# Patient Record
Sex: MALE | Race: BLACK OR AFRICAN AMERICAN
[De-identification: names, ages, dates, MRNs, and addresses within clinical notes are randomized per-mention and may not be internally consistent; named-entity substitution may affect disease eponyms.]

---

## 2019-07-17 ENCOUNTER — HOSPITAL ENCOUNTER (EMERGENCY)
Dept: HOSPITAL 74 - JER | Age: 24
Discharge: HOME | End: 2019-07-17
Payer: COMMERCIAL

## 2019-07-17 VITALS — DIASTOLIC BLOOD PRESSURE: 70 MMHG | HEART RATE: 83 BPM | TEMPERATURE: 98.1 F | SYSTOLIC BLOOD PRESSURE: 120 MMHG

## 2019-07-17 VITALS — BODY MASS INDEX: 30.4 KG/M2

## 2019-07-17 DIAGNOSIS — R10.31: ICD-10-CM

## 2019-07-17 DIAGNOSIS — R01.1: ICD-10-CM

## 2019-07-17 DIAGNOSIS — N30.00: Primary | ICD-10-CM

## 2019-07-17 LAB
AMPHET UR-MCNC: NEGATIVE NG/ML
APPEARANCE UR: CLEAR
BACTERIA # UR AUTO: 4.2 /HPF
BARBITURATES UR-MCNC: NEGATIVE NG/ML
BASOPHILS # BLD: 0.6 % (ref 0–2)
BENZODIAZ UR SCN-MCNC: NEGATIVE NG/ML
BILIRUB UR STRIP.AUTO-MCNC: NEGATIVE MG/DL
CASTS URNS QL MICRO: 5 /LPF (ref 0–8)
COCAINE UR-MCNC: NEGATIVE NG/ML
COLOR UR: YELLOW
DEPRECATED RDW RBC AUTO: 13.2 % (ref 11.9–15.9)
EOSINOPHIL # BLD: 0.7 % (ref 0–4.5)
EPITH CASTS URNS QL MICRO: 2.2 /HPF
HCT VFR BLD CALC: 44.7 % (ref 35.4–49)
HGB BLD-MCNC: 14.7 GM/DL (ref 11.7–16.9)
KETONES UR QL STRIP: NEGATIVE
LEUKOCYTE ESTERASE UR QL STRIP.AUTO: (no result)
LYMPHOCYTES # BLD: 32.7 % (ref 8–40)
MCH RBC QN AUTO: 28.3 PG (ref 25.7–33.7)
MCHC RBC AUTO-ENTMCNC: 32.9 G/DL (ref 32–35.9)
MCV RBC: 86 FL (ref 80–96)
METHADONE UR-MCNC: NEGATIVE NG/ML
MONOCYTES # BLD AUTO: 8.2 % (ref 3.8–10.2)
NEUTROPHILS # BLD: 57.8 % (ref 42.8–82.8)
NITRITE UR QL STRIP: NEGATIVE
OPIATES UR QL SCN: NEGATIVE NG/ML
PCP UR QL SCN: NEGATIVE NG/ML
PH UR: 6 [PH] (ref 5–8)
PLATELET # BLD AUTO: 249 K/MM3 (ref 134–434)
PMV BLD: 8.4 FL (ref 7.5–11.1)
PROT UR QL STRIP: NEGATIVE
PROT UR QL STRIP: NEGATIVE
RBC # BLD AUTO: 1 /HPF (ref 0–4)
RBC # BLD AUTO: 5.19 M/MM3 (ref 4–5.6)
SP GR UR: 1.02 (ref 1.01–1.03)
UROBILINOGEN UR STRIP-MCNC: 0.2 MG/DL (ref 0.2–1)
WBC # BLD AUTO: 7 K/MM3 (ref 4–10)
WBC # UR AUTO: 21 /HPF (ref 0–5)

## 2019-07-17 NOTE — PDOC
History of Present Illness





- General


Chief Complaint: Pain, Acute


Stated Complaint: ABD PAIN


Time Seen by Provider: 07/17/19 17:02





- History of Present Illness


Initial Comments: 





07/17/19 17:45


CHIEF COMPLAINT: RLQ pain 





HISTORY OF PRESENT ILLNESS: 25 yo M presents to ED with RLQ pain since this 

morning.  Patient states he's had on and off stomach pain x few months but this 

morning it was worse. Patient does admit to drinking heavily almost daily, last 

drink two days ago.  He describes the pain as tearing but "it's not there 

anymore.  I've been having these stomach pains in the morning for like 1-2 

years now, but today it was a lot worse."  Patient reports last BM was just 

PTA. Patient denies any nausea, vomiting, fever, rectal bleeding.   Does not 

have PCP.    








No recent travel or sick contacts. 





PAST MEDICAL HISTORY: Denies past medical history





FAMILY HISTORY: Denies





SOCIAL HISTORY:  Drinks daily, "1 bottle of Lyla every other day, on the 

weekends I'll have like 1-2 bottles ."  Denies cigarette, illicit drug use. 





SURGICAL HISTORY:  R orchiectomy





ALLERGIES: No known drug allergies





REVIEW OF SYSTEMS


General/Constitutional: Denies fever or chills. Denies weakness, weight change.





HEENT: Denies change in vision. Denies ear pain or discharge. Denies sore 

throat.





Cardiovascular: Denies chest pain or shortness of breath.





Respiratory: Denies cough, wheezing, or hemoptysis.





Gastrointestinal: Abdominal pain this morning, now resolved. Denies nausea, 

vomiting, diarrhea or constipation. Denies rectal bleeding.





Genitourinary: Denies dysuria, frequency, or change in urination.





Musculoskeletal: Denies joint or muscle swelling or pain. Denies neck or back 

pain.





Skin: Denies rash or easy bruising.








PHYSICAL EXAM


General Appearance: Well-appearing, appropriately dressed.  No apparent distress

, no intoxication.





HEENT: EOMI, PERRLA, normal ENT inspection, normal voice, TMs normal, pharynx 

normal.  No conjunctival pallor.  No photophobia, scleral icterus.





Neck: Supple.  Trachea midline. No tenderness, rigidity, carotid bruit, stridor

, lymphadenopathy, or thyromegaly. 





Respiratory/Chest: Lungs CTAB.  No shortness of breath, chest tenderness, 

respiratory distress, accessory muscle use. No crackles, rales, rhonchi, stridor

, wheezing, dullness





Cardiovascular: RRR. S1, S2.  No JVD, murmur, bradycardia, tachycardia.





Vascular Pulses: Dorsalis-Pedis (R): 2+, Dorsalis-Pedis (L): 2+





Gastrointestinal/Abdominal: Normal bowel sounds.  Abdomen soft, non-distended.  

No tenderness or rebound tenderness. No  organomegaly, pulsatile mass, guarding

, hernia, hepatomegaly, splenomegaly.





Lymphatic: No adenopathy, tenderness.





Musculoskeletal/Extremities:  Normal inspection. FROM of all extremities, 

normal capillary refill.  Pelvis Stable.  No CVA tenderness. No tenderness to 

extremities, pedal edema, swelling, erythema or deformity.





Integumentary: Appropriate color, dry, warm.  No cyanosis, erythema, jaundice 

or rash





Neurologic: CNs II-XII intact. Fully oriented, alert.  Appropriate mood/affect. 

Motor strength 5/5.  No appreciable EOM palsy, facial droop or sensory deficit.








Past History





- Past Medical History


Allergies/Adverse Reactions: 


 Allergies











Allergy/AdvReac Type Severity Reaction Status Date / Time


 


No Known Allergies Allergy   Verified 07/26/16 20:46











Home Medications: 


Ambulatory Orders





Nitrofurantoin Monohyd/M-Cryst [Macrobid -] 100 mg PO BID #14 capsule 07/17/19 








Cardiac Disorders:  (HEART MURMUR)





- Suicide/Smoking/Psychosocial Hx


Smoking History: Never smoked


Have you smoked in the past 12 months: No


Number of Cigarettes Smoked Daily: 2


Information on smoking cessation initiated: No


'Breaking Loose' booklet given: 08/15/15


Hx Alcohol Use: No


Drug/Substance Use Hx: No


Substance Use Type: None





*Physical Exam





- Vital Signs


 Last Vital Signs











Temp Pulse Resp BP Pulse Ox


 


 98.1 F   83   18   120/70   98 


 


 07/17/19 16:32  07/17/19 16:32  07/17/19 16:32  07/17/19 16:32  07/17/19 16:32














ED Treatment Course





- LABORATORY


CBC & Chemistry Diagram: 


 07/17/19 18:05





 07/17/19 18:05





Medical Decision Making





- Medical Decision Making





07/17/19 17:53


25 yo M presents to ED with RLQ pain since this morning.





-labs


-UA, utox








07/17/19 19:26


Labs hemolyzed, pending repeat CMP.  





Patient states he wants to go home now as his symptoms have resolved. Pt with 

21 WBC on UA, will rx abx for UTI. 





Advised patient to take medication as prescribed.  Advised patient of signs and 

symptoms for return to ED.  Patient verbalized understanding and agrees to plan.





*DC/Admit/Observation/Transfer


Diagnosis at time of Disposition: 


Abdominal pain


Qualifiers:


 Abdominal location: right lower quadrant Qualified Code(s): R10.31 - Right 

lower quadrant pain





Urinary tract infection


Qualifiers:


 Urinary tract infection type: acute cystitis Hematuria presence: without 

hematuria Qualified Code(s): N30.00 - Acute cystitis without hematuria








- Discharge Dispostion


Disposition: HOME


Condition at time of disposition: Stable


Decision to Admit order: No





- Prescriptions


Prescriptions: 


Nitrofurantoin Monohyd/M-Cryst [Macrobid -] 100 mg PO BID #14 capsule





- Referrals





- Patient Instructions


Printed Discharge Instructions:  DI for Urinary Tract Infection (UTI)


Additional Instructions: 


Please take medications as prescribed.  Return to the emergency department 

immediately with ANY new, persistent or worsening symptoms.





- Post Discharge Activity

## 2019-07-17 NOTE — PDOC
Rapid Medical Evaluation


Chief Complaint: Pain, Acute


Medical Evaluation: 


 Allergies











Allergy/AdvReac Type Severity Reaction Status Date / Time


 


No Known Allergies Allergy   Verified 07/26/16 20:46











07/17/19 16:32


I have performed a brief in-person evaluation of this patient. 


The patient presents with a chief complaint of: RLQ started this AM, No N/V - 

admits to drinking hard liquor daily- had alcoholic gastric ulcers 2 years ago 

- states last drink 2 days ago


Pertinent physical exam findings: Abd soft with no rebound 


I have ordered the following: labs/ Ua/ tox


The patient will proceed to the ED for further evaluation.


07/17/19 16:37








**Discharge Disposition





- Diagnosis


 Abdominal pain








- Referrals





- Patient Instructions





- Post Discharge Activity

## 2021-03-07 ENCOUNTER — HOSPITAL ENCOUNTER (INPATIENT)
Dept: HOSPITAL 74 - JER | Age: 26
LOS: 1 days | Discharge: HOME | DRG: 225 | End: 2021-03-08
Attending: NURSE PRACTITIONER | Admitting: HOSPITALIST
Payer: COMMERCIAL

## 2021-03-07 VITALS — BODY MASS INDEX: 31.1 KG/M2

## 2021-03-07 DIAGNOSIS — F10.10: ICD-10-CM

## 2021-03-07 DIAGNOSIS — N17.9: ICD-10-CM

## 2021-03-07 DIAGNOSIS — E66.3: ICD-10-CM

## 2021-03-07 DIAGNOSIS — K76.0: ICD-10-CM

## 2021-03-07 DIAGNOSIS — R10.31: ICD-10-CM

## 2021-03-07 DIAGNOSIS — R74.01: ICD-10-CM

## 2021-03-07 DIAGNOSIS — K35.80: Primary | ICD-10-CM

## 2021-03-07 DIAGNOSIS — R74.8: ICD-10-CM

## 2021-03-07 LAB
ALBUMIN SERPL-MCNC: 4 G/DL (ref 3.4–5)
ALP SERPL-CCNC: 72 U/L (ref 45–117)
ALT SERPL-CCNC: 126 U/L (ref 13–61)
ANION GAP SERPL CALC-SCNC: 6 MMOL/L (ref 8–16)
APPEARANCE UR: CLEAR
AST SERPL-CCNC: 47 U/L (ref 15–37)
BASOPHILS # BLD: 0.9 % (ref 0–2)
BILIRUB SERPL-MCNC: 0.4 MG/DL (ref 0.2–1)
BILIRUB UR STRIP.AUTO-MCNC: NEGATIVE MG/DL
BUN SERPL-MCNC: 17.3 MG/DL (ref 7–18)
CALCIUM SERPL-MCNC: 9.6 MG/DL (ref 8.5–10.1)
CHLORIDE SERPL-SCNC: 101 MMOL/L (ref 98–107)
CO2 SERPL-SCNC: 30 MMOL/L (ref 21–32)
COLOR UR: YELLOW
CREAT SERPL-MCNC: 1.4 MG/DL (ref 0.55–1.3)
DEPRECATED RDW RBC AUTO: 13.7 % (ref 11.9–15.9)
EOSINOPHIL # BLD: 1 % (ref 0–4.5)
GLUCOSE SERPL-MCNC: 92 MG/DL (ref 74–106)
HCT VFR BLD CALC: 42.3 % (ref 35.4–49)
HGB BLD-MCNC: 14.2 GM/DL (ref 11.7–16.9)
HIV 1+2 AB+HIV1 P24 AG SERPL QL IA: NEGATIVE
KETONES UR QL STRIP: NEGATIVE
LEUKOCYTE ESTERASE UR QL STRIP.AUTO: NEGATIVE
LIPASE SERPL-CCNC: 71 U/L (ref 73–393)
LYMPHOCYTES # BLD: 29.8 % (ref 8–40)
MCH RBC QN AUTO: 29.3 PG (ref 25.7–33.7)
MCHC RBC AUTO-ENTMCNC: 33.6 G/DL (ref 32–35.9)
MCV RBC: 87.2 FL (ref 80–96)
MONOCYTES # BLD AUTO: 8.7 % (ref 3.8–10.2)
NEUTROPHILS # BLD: 59.6 % (ref 42.8–82.8)
NITRITE UR QL STRIP: NEGATIVE
PH UR: 6.5 [PH] (ref 5–8)
PLATELET # BLD AUTO: 243 K/MM3 (ref 134–434)
PMV BLD: 8.4 FL (ref 7.5–11.1)
PROT SERPL-MCNC: 7.9 G/DL (ref 6.4–8.2)
PROT UR QL STRIP: NEGATIVE
PROT UR QL STRIP: NEGATIVE
RBC # BLD AUTO: 4.85 M/MM3 (ref 4–5.6)
SODIUM SERPL-SCNC: 137 MMOL/L (ref 136–145)
SP GR UR: 1.02 (ref 1.01–1.03)
TREPONEMA PALLIDUM AB [UNITS/VOLUME] IN SERUM OR PLASMA BY IMMUNOASSAY: (no result)
UROBILINOGEN UR STRIP-MCNC: 1 MG/DL (ref 0.2–1)
WBC # BLD AUTO: 10.8 K/MM3 (ref 4–10)

## 2021-03-07 PROCEDURE — U0003 INFECTIOUS AGENT DETECTION BY NUCLEIC ACID (DNA OR RNA); SEVERE ACUTE RESPIRATORY SYNDROME CORONAVIRUS 2 (SARS-COV-2) (CORONAVIRUS DISEASE [COVID-19]), AMPLIFIED PROBE TECHNIQUE, MAKING USE OF HIGH THROUGHPUT TECHNOLOGIES AS DESCRIBED BY CMS-2020-01-R: HCPCS

## 2021-03-07 PROCEDURE — 0DTJ4ZZ RESECTION OF APPENDIX, PERCUTANEOUS ENDOSCOPIC APPROACH: ICD-10-PCS | Performed by: SURGERY

## 2021-03-07 PROCEDURE — C9803 HOPD COVID-19 SPEC COLLECT: HCPCS

## 2021-03-07 RX ADMIN — HEPARIN SODIUM SCH UNIT: 5000 INJECTION, SOLUTION INTRAVENOUS; SUBCUTANEOUS at 21:05

## 2021-03-07 RX ADMIN — ACETAMINOPHEN SCH MG: 325 TABLET ORAL at 23:58

## 2021-03-08 VITALS — TEMPERATURE: 98 F | HEART RATE: 72 BPM | SYSTOLIC BLOOD PRESSURE: 125 MMHG | DIASTOLIC BLOOD PRESSURE: 71 MMHG

## 2021-03-08 LAB
ALBUMIN SERPL-MCNC: 3.4 G/DL (ref 3.4–5)
ALP SERPL-CCNC: 48 U/L (ref 45–117)
ALT SERPL-CCNC: 99 U/L (ref 13–61)
ANION GAP SERPL CALC-SCNC: 5 MMOL/L (ref 8–16)
AST SERPL-CCNC: 42 U/L (ref 15–37)
BILIRUB SERPL-MCNC: 0.4 MG/DL (ref 0.2–1)
BUN SERPL-MCNC: 10.4 MG/DL (ref 7–18)
CALCIUM SERPL-MCNC: 9.2 MG/DL (ref 8.5–10.1)
CHLORIDE SERPL-SCNC: 108 MMOL/L (ref 98–107)
CO2 SERPL-SCNC: 28 MMOL/L (ref 21–32)
CREAT SERPL-MCNC: 1 MG/DL (ref 0.55–1.3)
DEPRECATED RDW RBC AUTO: 13.6 % (ref 11.9–15.9)
GLUCOSE SERPL-MCNC: 102 MG/DL (ref 74–106)
HCT VFR BLD CALC: 38.9 % (ref 35.4–49)
HGB BLD-MCNC: 12.6 GM/DL (ref 11.7–16.9)
MAGNESIUM SERPL-MCNC: 2.1 MG/DL (ref 1.8–2.4)
MCH RBC QN AUTO: 28.8 PG (ref 25.7–33.7)
MCHC RBC AUTO-ENTMCNC: 32.3 G/DL (ref 32–35.9)
MCV RBC: 88.9 FL (ref 80–96)
PHOSPHATE SERPL-MCNC: 3.9 MG/DL (ref 2.5–4.9)
PLATELET # BLD AUTO: 242 K/MM3 (ref 134–434)
PMV BLD: 8.8 FL (ref 7.5–11.1)
PROT SERPL-MCNC: 7 G/DL (ref 6.4–8.2)
RBC # BLD AUTO: 4.38 M/MM3 (ref 4–5.6)
SODIUM SERPL-SCNC: 141 MMOL/L (ref 136–145)
WBC # BLD AUTO: 12.5 K/MM3 (ref 4–10)

## 2021-03-08 RX ADMIN — HEPARIN SODIUM SCH UNIT: 5000 INJECTION, SOLUTION INTRAVENOUS; SUBCUTANEOUS at 10:23

## 2021-03-08 RX ADMIN — ACETAMINOPHEN SCH MG: 325 TABLET ORAL at 05:46

## 2022-01-25 ENCOUNTER — HOSPITAL ENCOUNTER (EMERGENCY)
Dept: HOSPITAL 74 - JER | Age: 27
Discharge: HOME | End: 2022-01-25
Payer: COMMERCIAL

## 2022-01-25 VITALS — BODY MASS INDEX: 32.3 KG/M2

## 2022-01-25 VITALS — DIASTOLIC BLOOD PRESSURE: 76 MMHG | TEMPERATURE: 98.6 F | SYSTOLIC BLOOD PRESSURE: 137 MMHG | HEART RATE: 97 BPM

## 2022-01-25 DIAGNOSIS — R10.9: Primary | ICD-10-CM

## 2022-02-15 ENCOUNTER — HOSPITAL ENCOUNTER (EMERGENCY)
Dept: HOSPITAL 74 - JER | Age: 27
Discharge: HOME | End: 2022-02-15
Payer: COMMERCIAL

## 2022-02-15 VITALS — SYSTOLIC BLOOD PRESSURE: 131 MMHG | TEMPERATURE: 98 F | HEART RATE: 85 BPM | DIASTOLIC BLOOD PRESSURE: 83 MMHG

## 2022-02-15 VITALS — BODY MASS INDEX: 32.1 KG/M2

## 2022-02-15 DIAGNOSIS — R10.31: Primary | ICD-10-CM

## 2022-02-15 LAB
ALBUMIN SERPL-MCNC: 4 G/DL (ref 3.4–5)
ALP SERPL-CCNC: 54 U/L (ref 45–117)
ALT SERPL-CCNC: 126 U/L (ref 13–61)
ANION GAP SERPL CALC-SCNC: 6 MMOL/L (ref 8–16)
AST SERPL-CCNC: 48 U/L (ref 15–37)
BASOPHILS # BLD: 0.5 % (ref 0–2)
BILIRUB SERPL-MCNC: 0.4 MG/DL (ref 0.2–1)
BUN SERPL-MCNC: 18.8 MG/DL (ref 7–18)
CALCIUM SERPL-MCNC: 9.8 MG/DL (ref 8.5–10.1)
CHLORIDE SERPL-SCNC: 100 MMOL/L (ref 98–107)
CO2 SERPL-SCNC: 31 MMOL/L (ref 21–32)
CREAT SERPL-MCNC: 1.2 MG/DL (ref 0.55–1.3)
DEPRECATED RDW RBC AUTO: 13.5 % (ref 11.9–15.9)
EOSINOPHIL # BLD: 1.2 % (ref 0–4.5)
GLUCOSE SERPL-MCNC: 120 MG/DL (ref 74–106)
HCT VFR BLD CALC: 41.7 % (ref 35.4–49)
HGB BLD-MCNC: 13.9 GM/DL (ref 11.7–16.9)
LYMPHOCYTES # BLD: 35.9 % (ref 8–40)
MCH RBC QN AUTO: 28.1 PG (ref 25.7–33.7)
MCHC RBC AUTO-ENTMCNC: 33.2 G/DL (ref 32–35.9)
MCV RBC: 84.7 FL (ref 80–96)
MONOCYTES # BLD AUTO: 7.5 % (ref 3.8–10.2)
NEUTROPHILS # BLD: 54.9 % (ref 42.8–82.8)
PLATELET # BLD AUTO: 222 10^3/UL (ref 134–434)
PMV BLD: 8.3 FL (ref 7.5–11.1)
PROT SERPL-MCNC: 7.9 G/DL (ref 6.4–8.2)
RBC # BLD AUTO: 4.92 M/MM3 (ref 4–5.6)
SODIUM SERPL-SCNC: 137 MMOL/L (ref 136–145)
WBC # BLD AUTO: 6.4 K/MM3 (ref 4–10)

## 2022-02-15 PROCEDURE — 3E033GC INTRODUCTION OF OTHER THERAPEUTIC SUBSTANCE INTO PERIPHERAL VEIN, PERCUTANEOUS APPROACH: ICD-10-PCS | Performed by: EMERGENCY MEDICINE

## 2022-11-03 ENCOUNTER — HOSPITAL ENCOUNTER (EMERGENCY)
Dept: HOSPITAL 74 - JERFT | Age: 27
Discharge: HOME | End: 2022-11-03
Payer: COMMERCIAL

## 2022-11-03 VITALS — BODY MASS INDEX: 31.5 KG/M2

## 2022-11-03 VITALS
DIASTOLIC BLOOD PRESSURE: 86 MMHG | RESPIRATION RATE: 18 BRPM | HEART RATE: 95 BPM | TEMPERATURE: 98.9 F | SYSTOLIC BLOOD PRESSURE: 150 MMHG
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
## 2022-11-03 DIAGNOSIS — T25.321A: Primary | ICD-10-CM

## 2023-05-04 ENCOUNTER — HOSPITAL ENCOUNTER (EMERGENCY)
Dept: HOSPITAL 74 - JER | Age: 28
Discharge: HOME | End: 2023-05-04
Payer: COMMERCIAL

## 2023-05-04 VITALS — BODY MASS INDEX: 30.4 KG/M2

## 2023-05-04 VITALS — SYSTOLIC BLOOD PRESSURE: 140 MMHG | HEART RATE: 89 BPM | DIASTOLIC BLOOD PRESSURE: 87 MMHG | TEMPERATURE: 97.9 F

## 2023-05-04 VITALS — RESPIRATION RATE: 18 BRPM

## 2023-05-04 DIAGNOSIS — J98.01: Primary | ICD-10-CM

## 2023-05-04 DIAGNOSIS — K76.0: ICD-10-CM

## 2023-05-04 DIAGNOSIS — R74.01: ICD-10-CM

## 2023-05-04 LAB
ALBUMIN SERPL-MCNC: 4.3 G/DL (ref 3.4–5)
ALP SERPL-CCNC: 59 U/L (ref 45–117)
ALT SERPL-CCNC: 124 U/L (ref 13–61)
ANION GAP SERPL CALC-SCNC: 6 MMOL/L (ref 8–16)
AST SERPL-CCNC: 66 U/L (ref 15–37)
BASOPHILS # BLD: 0.8 % (ref 0–2)
BILIRUB SERPL-MCNC: 0.4 MG/DL (ref 0.2–1)
BUN SERPL-MCNC: 9.4 MG/DL (ref 7–18)
CALCIUM SERPL-MCNC: 9.6 MG/DL (ref 8.5–10.1)
CHLORIDE SERPL-SCNC: 104 MMOL/L (ref 98–107)
CO2 SERPL-SCNC: 26 MMOL/L (ref 21–32)
CREAT SERPL-MCNC: 1 MG/DL (ref 0.55–1.3)
DEPRECATED RDW RBC AUTO: 15.1 % (ref 11.9–15.9)
EOSINOPHIL # BLD: 0.7 % (ref 0–4.5)
GLUCOSE SERPL-MCNC: 104 MG/DL (ref 74–106)
HCT VFR BLD CALC: 43.5 % (ref 35.4–49)
HGB BLD-MCNC: 14.6 GM/DL (ref 11.7–16.9)
LIPASE SERPL-CCNC: 59 U/L (ref 73–393)
LYMPHOCYTES # BLD: 31.4 % (ref 8–40)
MCH RBC QN AUTO: 28.1 PG (ref 25.7–33.7)
MCHC RBC AUTO-ENTMCNC: 33.5 G/DL (ref 32–35.9)
MCV RBC: 84 FL (ref 80–96)
MONOCYTES # BLD AUTO: 8.2 % (ref 3.8–10.2)
NEUTROPHILS # BLD: 58.9 % (ref 42.8–82.8)
PLATELET # BLD AUTO: 289 10^3/UL (ref 134–434)
PMV BLD: 8.3 FL (ref 7.5–11.1)
POTASSIUM SERPLBLD-SCNC: 4.1 MMOL/L (ref 3.5–5.1)
PROT SERPL-MCNC: 8.3 G/DL (ref 6.4–8.2)
RBC # BLD AUTO: 5.18 M/MM3 (ref 4–5.6)
SODIUM SERPL-SCNC: 136 MMOL/L (ref 136–145)
WBC # BLD AUTO: 8.3 K/MM3 (ref 4–10)